# Patient Record
Sex: FEMALE | Race: BLACK OR AFRICAN AMERICAN | Employment: FULL TIME | URBAN - METROPOLITAN AREA
[De-identification: names, ages, dates, MRNs, and addresses within clinical notes are randomized per-mention and may not be internally consistent; named-entity substitution may affect disease eponyms.]

---

## 2017-02-15 ENCOUNTER — OFFICE VISIT (OUTPATIENT)
Dept: SURGERY | Age: 52
End: 2017-02-15

## 2017-02-15 VITALS
BODY MASS INDEX: 44.41 KG/M2 | DIASTOLIC BLOOD PRESSURE: 80 MMHG | HEIGHT: 68 IN | HEART RATE: 78 BPM | SYSTOLIC BLOOD PRESSURE: 142 MMHG | RESPIRATION RATE: 18 BRPM | TEMPERATURE: 97.8 F | OXYGEN SATURATION: 99 % | WEIGHT: 293 LBS

## 2017-02-15 DIAGNOSIS — K43.9 HERNIA OF ANTERIOR ABDOMINAL WALL: ICD-10-CM

## 2017-02-15 DIAGNOSIS — Z98.84 H/O GASTRIC BYPASS: ICD-10-CM

## 2017-02-15 DIAGNOSIS — K90.9 INTESTINAL MALABSORPTION, UNSPECIFIED TYPE: ICD-10-CM

## 2017-02-15 DIAGNOSIS — E66.01 MORBID OBESITY DUE TO EXCESS CALORIES (HCC): Primary | ICD-10-CM

## 2017-02-15 RX ORDER — METFORMIN HYDROCHLORIDE 500 MG/1
TABLET ORAL 2 TIMES DAILY WITH MEALS
COMMUNITY

## 2017-02-15 RX ORDER — MELOXICAM 15 MG/1
TABLET ORAL
COMMUNITY
Start: 2017-02-03

## 2017-02-15 RX ORDER — AMLODIPINE BESYLATE 10 MG/1
TABLET ORAL DAILY
COMMUNITY

## 2017-02-15 NOTE — PROGRESS NOTES
1. Have you been to the ER, urgent care clinic since your last visit? Hospitalized since your last visit? No    2. Have you seen or consulted any other health care providers outside of the [address] [place] [address] since your last visit? Include any pap smears or colon screening.  No

## 2017-02-15 NOTE — PROGRESS NOTES
Initial Consultation for possible revision of previous Bariatric Surgery     Kristine Smith is a 54 y.o. y.o. female who comes into the office today for initial consultation for the surgical options for the treatment of morbid obesity. The patient has chronic obesity unresponsive to numerous treatment strategies. The patient has tried a variety of weight-loss attempts including previous lap RYGB by Dr Dung Rosa in 2004, but has had weight regain over recent years, notably related to increased portions and no dumping- can eat sugar. Her pre-surgery weight was 367 lbs and her lowest postoperative body weight was aproximately 199 lbs. She has regained aproximately all of the lost weight over 7 years. She has been told that her previous surgery is gastric bypass. She does not  receive feedback from the previous surgery. She loves chips and pretzels and grazes on these most of the day. She can eat candy without dumping. The patient has significant health problems due to severe obesity including below. Today the patient is Height: 5' 8\" (172.7 cm) , Weight: (!) 367 lb 8 oz (166.7 kg) for a BMI of Body mass index is 55.88 kg/(m^2). [facility] Weight Loss Metrics 2/15/2017   Today's Wt 367 lb 8 oz   BMI 55.88 kg/m2       Body mass index is 55.88 kg/(m^2). Past Medical History   Diagnosis Date    Anemia     Depression     Diabetes ([address]     Hernia     Hypertension     Joint pain     Joint swelling        Past Surgical History   Procedure Laterality Date    Hx hysterectomy         Current Outpatient Prescriptions   Medication Sig Dispense Refill    meloxicam (MOBIC) 15 mg tablet       amLODIPine (NORVASC) 10 mg tablet Take  by mouth daily.  metFORMIN (GLUCOPHAGE) 500 mg tablet Take  by mouth two (2) times daily (with meals).          No Known Allergies    Social History   Substance Use Topics    Smoking status: Never Smoker    Smokeless tobacco: Never Used    Alcohol use No       Family History Problem Relation Age of Onset    Diabetes Mother     Hypertension Mother     Diabetes Father     Heart Disease Father     Hypertension Father     Cancer Sister     Diabetes Sister     Hypertension Sister     Heart Disease Brother     Hypertension Brother        ROS, positive where in bold:    General: fevers, chills, night sweats, fatigue, weight loss, weight gain. GI: abdominal pain, nausea, vomiting, change in bowel habits, hematochezia, melena, or denies GERD. Integumentary: dermatitis or abnormal moles. HEENT:  visual changes, vertigo, epistaxis, dysphagia, hoarseness    Cardiac: chest pain, palpitations, hypertension, edema,  varicosities    Resp:  cough, shortness of breath, wheezing, hemoptysis, snoring,  reactive airway disease    : hematuria, dysuria, frequency, urgency, nocturia, stress urinary incontinence    MSK: weakness, joint pain,  arthritis    Endocrine: diabetes, thyroid disease, polyuria, polydipsia, polyphagia, poor wound healing, heat intolerance,cold intolerance. Lymph/Heme: anemia, bruising,  history of blood transfusions. Neuro: dizziness, headache, fainting, seizures, stroke.     Psychiatry:  Anxiety, depression, psychosis      Physical Exam:  Visit Vitals    /80 (BP 1 Location: Left arm, BP Patient Position: Sitting)    Pulse 78    Temp 97.8 °F (36.6 °C) (Oral)    Resp 18    Ht 5' 8\" (1.727 m)    Wt (!) 367 lb 8 oz (166.7 kg)    SpO2 99%    BMI 55.88 kg/m2       General: Well developed, well nourished 54 y.o. female in no acute distress  ENMT: normocephalic, atraumatic   Skin: warm, smooth, dry and well perfused  Respiratory:excursions normal and symmetrical  Cardiovascular: Regular rate and rhythm,   Gastrointestinal: soft, nontender, nondistended, unable to feel abdominal wall hernia  Musculoskeletal: normal gait/station  Neuro: strength grossly intact and symmetrical  Psych: alert and oriented to person, place and time      Impression:    Brain Muse Eliseo Bhakta is a 54 y.o. female who is suffering from morbid obesity with a BMI of Body mass index is 55.88 kg/(m^2). and comorbidities who could benefit from weight loss significantly. She is struggling after previous gastric bypass. I believe she may be best managed by anatomic and behavior evaluations. Work up will include an EGD and an UGI series to assess the previous gastric bypass anatomy. In addition will check her routine labs for nutrition follow up. Labs ordered today. Will schedule with RD. We will discuss the results of the above evaluation with the patient when testing is completed. CT scan to assess abdominal wall hernia    . More than 50% of this encounter was spent in direct counseling for this patient regarding the ongoing evaluation and treatment as well as options for future care. All questions have been answered in detail and the patient has expressed satisfaction regarding understanding of all this information. At least 45 minutes were spent in counseling during this encounter.

## 2017-02-17 LAB
1,25(OH)2D3 SERPL-MCNC: 49.2 PG/ML (ref 19.9–79.3)
ALBUMIN SERPL-MCNC: 4.3 G/DL (ref 3.5–5.5)
ALBUMIN/GLOB SERPL: 1.4 {RATIO} (ref 1.1–2.5)
ALP SERPL-CCNC: 145 IU/L (ref 39–117)
ALT SERPL-CCNC: 13 IU/L (ref 0–32)
AST SERPL-CCNC: 14 IU/L (ref 0–40)
BASOPHILS # BLD AUTO: 0.1 X10E3/UL (ref 0–0.2)
BASOPHILS NFR BLD AUTO: 1 %
BILIRUB SERPL-MCNC: 0.5 MG/DL (ref 0–1.2)
BUN SERPL-MCNC: 12 MG/DL (ref 6–24)
BUN/CREAT SERPL: 15 (ref 9–23)
CALCIUM SERPL-MCNC: 9.6 MG/DL (ref 8.7–10.2)
CHLORIDE SERPL-SCNC: 100 MMOL/L (ref 96–106)
CO2 SERPL-SCNC: 27 MMOL/L (ref 18–29)
CREAT SERPL-MCNC: 0.78 MG/DL (ref 0.57–1)
EOSINOPHIL # BLD AUTO: 0.1 X10E3/UL (ref 0–0.4)
EOSINOPHIL NFR BLD AUTO: 1 %
ERYTHROCYTE [DISTWIDTH] IN BLOOD BY AUTOMATED COUNT: 14.3 % (ref 12.3–15.4)
FOLATE SERPL-MCNC: 14.9 NG/ML
GLOBULIN SER CALC-MCNC: 3.1 G/DL (ref 1.5–4.5)
GLUCOSE SERPL-MCNC: 114 MG/DL (ref 65–99)
HCT VFR BLD AUTO: 39.3 % (ref 34–46.6)
HGB BLD-MCNC: 12.9 G/DL (ref 11.1–15.9)
IMM GRANULOCYTES # BLD: 0 X10E3/UL (ref 0–0.1)
IMM GRANULOCYTES NFR BLD: 0 %
IRON SERPL-MCNC: 69 UG/DL (ref 27–159)
LYMPHOCYTES # BLD AUTO: 1.5 X10E3/UL (ref 0.7–3.1)
LYMPHOCYTES NFR BLD AUTO: 25 %
MCH RBC QN AUTO: 27.4 PG (ref 26.6–33)
MCHC RBC AUTO-ENTMCNC: 32.8 G/DL (ref 31.5–35.7)
MCV RBC AUTO: 83 FL (ref 79–97)
MONOCYTES # BLD AUTO: 0.3 X10E3/UL (ref 0.1–0.9)
MONOCYTES NFR BLD AUTO: 6 %
NEUTROPHILS # BLD AUTO: 3.8 X10E3/UL (ref 1.4–7)
NEUTROPHILS NFR BLD AUTO: 67 %
PLATELET # BLD AUTO: 341 X10E3/UL (ref 150–379)
POTASSIUM SERPL-SCNC: 4.9 MMOL/L (ref 3.5–5.2)
PROT SERPL-MCNC: 7.4 G/DL (ref 6–8.5)
RBC # BLD AUTO: 4.71 X10E6/UL (ref 3.77–5.28)
SODIUM SERPL-SCNC: 140 MMOL/L (ref 134–144)
VIT B1 BLD-SCNC: 135.6 NMOL/L (ref 66.5–200)
VIT B12 SERPL-MCNC: 218 PG/ML (ref 211–946)
WBC # BLD AUTO: 5.7 X10E3/UL (ref 3.4–10.8)

## 2017-02-24 ENCOUNTER — HOSPITAL ENCOUNTER (OUTPATIENT)
Dept: GENERAL RADIOLOGY | Age: 52
Discharge: HOME | End: 2017-02-24
Attending: SURGERY
Payer: COMMERCIAL

## 2017-02-24 DIAGNOSIS — Z98.84 H/O GASTRIC BYPASS: ICD-10-CM

## 2017-02-24 PROCEDURE — 74241 XR UPPER GI SERIES W KUB: CPT

## 2017-02-28 ENCOUNTER — ANESTHESIA (OUTPATIENT)
Dept: ENDOSCOPY | Age: 52
End: 2017-02-28
Payer: COMMERCIAL

## 2017-02-28 ENCOUNTER — ANESTHESIA EVENT (OUTPATIENT)
Dept: ENDOSCOPY | Age: 52
End: 2017-02-28
Payer: COMMERCIAL

## 2017-02-28 ENCOUNTER — HOSPITAL ENCOUNTER (OUTPATIENT)
Age: 52
Setting detail: OUTPATIENT SURGERY
Discharge: HOME | End: 2017-02-28
Attending: INTERNAL MEDICINE | Admitting: INTERNAL MEDICINE
Payer: COMMERCIAL

## 2017-02-28 VITALS
BODY MASS INDEX: 44.41 KG/M2 | TEMPERATURE: 97.7 F | RESPIRATION RATE: 22 BRPM | OXYGEN SATURATION: 100 % | SYSTOLIC BLOOD PRESSURE: 156 MMHG | HEART RATE: 72 BPM | HEIGHT: 68 IN | WEIGHT: 293 LBS | DIASTOLIC BLOOD PRESSURE: 73 MMHG

## 2017-02-28 LAB
GLUCOSE BLD STRIP.AUTO-MCNC: 109 MG/DL (ref 65–100)
SERVICE CMNT-IMP: ABNORMAL

## 2017-02-28 PROCEDURE — 76040000019: Performed by: INTERNAL MEDICINE

## 2017-02-28 PROCEDURE — 74011000250 HC RX REV CODE- 250

## 2017-02-28 PROCEDURE — 82962 GLUCOSE BLOOD TEST: CPT

## 2017-02-28 PROCEDURE — 74011250636 HC RX REV CODE- 250/636

## 2017-02-28 PROCEDURE — 76060000031 HC ANESTHESIA FIRST 0.5 HR: Performed by: INTERNAL MEDICINE

## 2017-02-28 PROCEDURE — 74011250636 HC RX REV CODE- 250/636: Performed by: INTERNAL MEDICINE

## 2017-02-28 PROCEDURE — 74011000258 HC RX REV CODE- 258

## 2017-02-28 RX ORDER — DEXTROMETHORPHAN/PSEUDOEPHED 2.5-7.5/.8
1.2 DROPS ORAL
Status: DISCONTINUED | OUTPATIENT
Start: 2017-02-28 | End: 2017-02-28 | Stop reason: HOSPADM

## 2017-02-28 RX ORDER — ATROPINE SULFATE 0.1 MG/ML
0.5 INJECTION INTRAVENOUS
Status: DISCONTINUED | OUTPATIENT
Start: 2017-02-28 | End: 2017-02-28 | Stop reason: HOSPADM

## 2017-02-28 RX ORDER — SODIUM CHLORIDE 0.9 % (FLUSH) 0.9 %
5-10 SYRINGE (ML) INJECTION EVERY 8 HOURS
Status: DISCONTINUED | OUTPATIENT
Start: 2017-02-28 | End: 2017-02-28 | Stop reason: HOSPADM

## 2017-02-28 RX ORDER — PROPOFOL 10 MG/ML
INJECTION, EMULSION INTRAVENOUS AS NEEDED
Status: DISCONTINUED | OUTPATIENT
Start: 2017-02-28 | End: 2017-02-28 | Stop reason: HOSPADM

## 2017-02-28 RX ORDER — MIDAZOLAM HYDROCHLORIDE 1 MG/ML
.25-1 INJECTION, SOLUTION INTRAMUSCULAR; INTRAVENOUS
Status: DISCONTINUED | OUTPATIENT
Start: 2017-02-28 | End: 2017-02-28 | Stop reason: HOSPADM

## 2017-02-28 RX ORDER — FENTANYL CITRATE 50 UG/ML
100 INJECTION, SOLUTION INTRAMUSCULAR; INTRAVENOUS
Status: DISCONTINUED | OUTPATIENT
Start: 2017-02-28 | End: 2017-02-28 | Stop reason: HOSPADM

## 2017-02-28 RX ORDER — LIDOCAINE HYDROCHLORIDE 20 MG/ML
INJECTION, SOLUTION EPIDURAL; INFILTRATION; INTRACAUDAL; PERINEURAL AS NEEDED
Status: DISCONTINUED | OUTPATIENT
Start: 2017-02-28 | End: 2017-02-28 | Stop reason: HOSPADM

## 2017-02-28 RX ORDER — NALOXONE HYDROCHLORIDE 0.4 MG/ML
0.4 INJECTION, SOLUTION INTRAMUSCULAR; INTRAVENOUS; SUBCUTANEOUS
Status: DISCONTINUED | OUTPATIENT
Start: 2017-02-28 | End: 2017-02-28 | Stop reason: HOSPADM

## 2017-02-28 RX ORDER — SODIUM CHLORIDE 9 MG/ML
INJECTION, SOLUTION INTRAVENOUS
Status: DISCONTINUED | OUTPATIENT
Start: 2017-02-28 | End: 2017-02-28 | Stop reason: HOSPADM

## 2017-02-28 RX ORDER — FLUMAZENIL 0.1 MG/ML
0.2 INJECTION INTRAVENOUS
Status: DISCONTINUED | OUTPATIENT
Start: 2017-02-28 | End: 2017-02-28 | Stop reason: HOSPADM

## 2017-02-28 RX ORDER — SODIUM CHLORIDE 0.9 % (FLUSH) 0.9 %
5-10 SYRINGE (ML) INJECTION AS NEEDED
Status: DISCONTINUED | OUTPATIENT
Start: 2017-02-28 | End: 2017-02-28 | Stop reason: HOSPADM

## 2017-02-28 RX ORDER — SODIUM CHLORIDE 9 MG/ML
100 INJECTION, SOLUTION INTRAVENOUS CONTINUOUS
Status: DISCONTINUED | OUTPATIENT
Start: 2017-02-28 | End: 2017-02-28 | Stop reason: HOSPADM

## 2017-02-28 RX ORDER — EPINEPHRINE 0.1 MG/ML
1 INJECTION INTRACARDIAC; INTRAVENOUS
Status: DISCONTINUED | OUTPATIENT
Start: 2017-02-28 | End: 2017-02-28 | Stop reason: HOSPADM

## 2017-02-28 RX ORDER — GLYCOPYRROLATE 0.2 MG/ML
INJECTION INTRAMUSCULAR; INTRAVENOUS AS NEEDED
Status: DISCONTINUED | OUTPATIENT
Start: 2017-02-28 | End: 2017-02-28 | Stop reason: HOSPADM

## 2017-02-28 RX ORDER — DIPHENHYDRAMINE HYDROCHLORIDE 50 MG/ML
50 INJECTION, SOLUTION INTRAMUSCULAR; INTRAVENOUS ONCE
Status: DISCONTINUED | OUTPATIENT
Start: 2017-02-28 | End: 2017-02-28 | Stop reason: HOSPADM

## 2017-02-28 RX ADMIN — SODIUM CHLORIDE: 9 INJECTION, SOLUTION INTRAVENOUS at 15:23

## 2017-02-28 RX ADMIN — PROPOFOL 50 MG: 10 INJECTION, EMULSION INTRAVENOUS at 15:44

## 2017-02-28 RX ADMIN — PROPOFOL 100 MG: 10 INJECTION, EMULSION INTRAVENOUS at 15:46

## 2017-02-28 RX ADMIN — PROPOFOL 100 MG: 10 INJECTION, EMULSION INTRAVENOUS at 15:43

## 2017-02-28 RX ADMIN — PROPOFOL 50 MG: 10 INJECTION, EMULSION INTRAVENOUS at 15:48

## 2017-02-28 RX ADMIN — GLYCOPYRROLATE 0.2 MG: 0.2 INJECTION INTRAMUSCULAR; INTRAVENOUS at 15:42

## 2017-02-28 RX ADMIN — SODIUM CHLORIDE 100 ML/HR: 900 INJECTION, SOLUTION INTRAVENOUS at 15:07

## 2017-02-28 RX ADMIN — LIDOCAINE HYDROCHLORIDE 60 MG: 20 INJECTION, SOLUTION EPIDURAL; INFILTRATION; INTRACAUDAL; PERINEURAL at 15:51

## 2017-02-28 RX ADMIN — PROPOFOL 50 MG: 10 INJECTION, EMULSION INTRAVENOUS at 15:52

## 2017-02-28 RX ADMIN — LIDOCAINE HYDROCHLORIDE 60 MG: 20 INJECTION, SOLUTION EPIDURAL; INFILTRATION; INTRACAUDAL; PERINEURAL at 15:43

## 2017-02-28 RX ADMIN — PROPOFOL 50 MG: 10 INJECTION, EMULSION INTRAVENOUS at 15:50

## 2017-02-28 RX ADMIN — PROPOFOL 50 MG: 10 INJECTION, EMULSION INTRAVENOUS at 15:45

## 2017-02-28 RX ADMIN — PROPOFOL 50 MG: 10 INJECTION, EMULSION INTRAVENOUS at 15:55

## 2017-02-28 NOTE — DISCHARGE INSTRUCTIONS
[address] [facility] [address], [address] [employer]    EGD DISCHARGE INSTRUCTIONS    Negrito Crump  [id number]  1965    Discomfort:  Sore throat- throat lozenges or warm salt water gargle  redness at IV site- apply warm compress to area; if redness or soreness persist- contact your physician  Gaseous discomfort- walking, belching will help relieve any discomfort  You may not operate a vehicle for 12 hours  You may not engage in an occupation involving machinery or appliances for rest of today  You may not drink alcoholic beverages for at least 12 hours  Avoid making any critical decisions for at least 24 hour  DIET  You may resume your regular diet - however -  remember your colon is empty and a heavy meal will produce gas. Avoid these foods:  vegetables, fried / greasy foods, carbonated drinks    ACTIVITY  You may resume your normal daily activities   Spend the remainder of the day resting -  avoid any strenuous activity. CALL M.D. ANY SIGN OF   Increasing pain, nausea, vomiting  Abdominal distension (swelling)  New increased bleeding (oral or rectal)  Fever (chills)  Pain in chest area  Bloody discharge from nose or mouth  Shortness of breath    Follow-up Instructions:   Call Dr. Sedalia Barthel for any questions or problems. Telephone # [phone]    ENDOSCOPY FINDINGS:   Your endoscopy showed normal appearing gastric bypass anatomy. Dr. Samuel Hopes will discuss the specific findings with you and the next step in the plan. Signed By: Aniyah Molina.  Sarah Young MD     2/28/2017  4:07 PM

## 2017-02-28 NOTE — PERIOP NOTES
,Anesthesia reports  meds given during procedure, see MAR. Received report from anesthesia staff on vital signs and status of patient.

## 2017-02-28 NOTE — ROUTINE PROCESS
Yarelis Clark  1965  [id number]    Situation:  Verbal report received from: Missy OLGUIN  Procedure: Procedure(s):  ESOPHAGOGASTRODUODENOSCOPY (EGD)    Background:    Preoperative diagnosis: STATUS POST GASTRIC BYPASS  Postoperative diagnosis: History of gastric bypass. Weight regained. :  Dr. Hilda Schneider  Assistant(s): Endoscopy Technician-1: Yohan Wynn  Endoscopy RN-1: Madiha Gramajo RN    Specimens: * No specimens in log *  H. Pylori  no    Assessment:  Intra-procedure medications   Anesthesia gave intra-procedure sedation and medications, see anesthesia flow sheet yes    Intravenous fluids: NS@ KVO     Vital signs stable     Abdominal assessment: round and soft     Recommendation:  Discharge patient per MD order.     Family or Friend   Permission to share finding with family or friend yes

## 2017-02-28 NOTE — H&P
[address]  [address], [address] [facility]      History and Physical       NAME:  Azul Holder   :   1965   MRN:   [id number]             History of Present Illness:  Patient is a 54 y.o. who is seen for weight regain following gastric bypass. She is being considered for gastric bypass revision by Dr. Raymond Hiarston. PMH:  Past Medical History:   Diagnosis Date    Anemia     Depression     Diabetes ([address]     Hernia     Hypertension     Joint pain     Joint swelling        PSH:  Past Surgical History:   Procedure Laterality Date    HX GASTRIC BYPASS      \"15 years ago\"    HX HYSTERECTOMY         Allergies:  No Known Allergies    Home Medications:  Prior to Admission Medications   Prescriptions Last Dose Informant Patient Reported? Taking? amLODIPine (NORVASC) 10 mg tablet 2017 at Unknown time  Yes Yes   Sig: Take  by mouth daily. meloxicam (MOBIC) 15 mg tablet 2017 at Unknown time  Yes Yes   metFORMIN (GLUCOPHAGE) 500 mg tablet 2017 at Unknown time  Yes Yes   Sig: Take  by mouth two (2) times daily (with meals).       Facility-Administered Medications: None       Hospital Medications:  Current Facility-Administered Medications   Medication Dose Route Frequency    0.9% sodium chloride infusion  100 mL/hr IntraVENous CONTINUOUS    sodium chloride (NS) flush 5-10 mL  5-10 mL IntraVENous Q8H    sodium chloride (NS) flush 5-10 mL  5-10 mL IntraVENous PRN    midazolam (VERSED) injection 0.25-10 mg  0.25-10 mg IntraVENous Multiple    fentaNYL citrate (PF) injection 100 mcg  100 mcg IntraVENous Multiple    naloxone (NARCAN) injection 0.4 mg  0.4 mg IntraVENous Multiple    flumazenil (ROMAZICON) 0.1 mg/mL injection 0.2 mg  0.2 mg IntraVENous Multiple    simethicone (MYLICON) 41PN/9.5QW oral drops 80 mg  1.2 mL Oral Multiple    diphenhydrAMINE (BENADRYL) injection 50 mg  50 mg IntraVENous ONCE    atropine injection 0.5 mg  0.5 mg IntraVENous ONCE PRN    EPINEPHrine (ADRENALIN) 0.1 mg/mL syringe 1 mg  1 mg Endoscopically ONCE PRN     Facility-Administered Medications Ordered in Other Encounters   Medication Dose Route Frequency    0.9% sodium chloride infusion   IntraVENous CONTINUOUS       Social History:  Social History   Substance Use Topics    Smoking status: Never Smoker    Smokeless tobacco: Never Used    Alcohol use No       Family History:  Family History   Problem Relation Age of Onset    Diabetes Mother     Hypertension Mother     Diabetes Father     Heart Disease Father     Hypertension Father     Cancer Sister     Diabetes Sister     Hypertension Sister     Heart Disease Brother     Hypertension Brother              Review of Systems:      Constitutional: negative fever, negative chills, negative weight loss  Eyes:   negative visual changes  ENT:   negative sore throat, tongue or lip swelling  Respiratory:  negative cough, negative dyspnea  Cards:  negative for chest pain, palpitations, lower extremity edema  GI:   See HPI  :  negative for frequency, dysuria  Integument:  negative for rash and pruritus  Heme:  negative for easy bruising and gum/nose bleeding  Musculoskel: negative for myalgias,  back pain and muscle weakness  Neuro: negative for headaches, dizziness, vertigo  Psych:  negative for feelings of anxiety, depression       Objective:   Patient Vitals for the past 8 hrs:   BP Temp Pulse Resp SpO2 Height Weight   02/28/17 1441 134/88 98.1 °F (36.7 °C) 76 18 99 % 5' 8\" (1.727 m) (!) 167.4 kg (369 lb 1 oz)             EXAM:     NEURO-a&o   HEENT-wnl   LUNGS-clear    COR-regular rate and rhythym     ABD-soft , no tenderness, no rebound, good bs     EXT-no edema     Data Review     No results for input(s): WBC, HGB, HCT, PLT, HGBEXT, HCTEXT, PLTEXT in the last 72 hours. No results for input(s): NA, K, CL, CO2, BUN, CREA, GLU, PHOS, CA in the last 72 hours.   No results for input(s): SGOT, GPT, AP, TBIL, TP, ALB, GLOB, GGT, AML, LPSE in the last 72 hours. No lab exists for component: AMYP, HLPSE  No results for input(s): INR, PTP, APTT in the last 72 hours. No lab exists for component: INREXT       Assessment:   · Weight regain after gastric bypass   There is no problem list on file for this patient. Plan:   · Endoscopic procedure with MAC     Signed By: Galen Pereira.  Alyssa Low MD     2/28/2017  3:40 PM

## 2017-02-28 NOTE — ANESTHESIA POSTPROCEDURE EVALUATION
Post-Anesthesia Evaluation and Assessment    Patient: Yarelis Clark MRN: [id number]  SSN: [id number]    YOB: 1965  Age: 54 y.o. Sex: female       Cardiovascular Function/Vital Signs  Visit Vitals    BP (!) 154/104    Pulse 91    Temp 36.5 °C (97.7 °F)    Resp 19    Ht 5' 8\" (1.727 m)    Wt (!) 167.4 kg (369 lb 1 oz)    SpO2 97%    BMI 56.12 kg/m2       Patient is status post MAC anesthesia for Procedure(s):  ESOPHAGOGASTRODUODENOSCOPY (EGD). Nausea/Vomiting: None    Postoperative hydration reviewed and adequate. Pain:  Pain Scale 1: Numeric (0 - 10) (02/28/17 1611)  Pain Intensity 1: 0 (02/28/17 1611)   Managed    Neurological Status: At baseline    Mental Status and Level of Consciousness: Arousable    Pulmonary Status:   O2 Device: Room air (02/28/17 1611)   Adequate oxygenation and airway patent    Complications related to anesthesia: None    Post-anesthesia assessment completed.  No concerns    Signed By: Tim De Leon MD     February 28, 2017

## 2017-02-28 NOTE — PROCEDURES
[facility] ([facility] Procedure Note    Shabnam Hedrick  1965  [id number]      Procedure: Endoscopic Gastroduodenoscopy --diagnostic    Indication: weight regain after gastric bypass    Pre-operative Diagnosis: see indication above    Post-operative Diagnosis: see findings below    : Niesha Rodríguez. Yoan Wise MD    Referring Provider:  Becca Infante MD    Anesthesia/Sedation:  MAC anesthesia Propofol        Procedure Details     After informed consent was obtained for the procedure, with all risks and benefits of procedure explained the patient was taken to the endoscopy suite and placed in the left lateral decubitus position. Following sequential administration of sedation as per above, the endoscope was inserted into the mouth and advanced under direct vision to the Venkata limb. A careful inspection was made as the gastroscope was withdrawn, including a retroflexed view of the gastric pouch; findings and interventions are described below. Findings: The esophagus was normal appearing. The Z line was normal appearing and was located at 37 cm. The gastric pouch was spherical in shape. There were no obvious G-G fistulae. The G-J anastomosis was located at 46 cm. There was no evidence of marginal ulceration. Retroflexion was performed on the jejunal side and the diameter of the G-J anastomosis was estimated at approximately 18-20 mm (GIF H180 scope is 9.8 mm in diameter). The gastric pouch was 9 cm in length. The blind end of the jejunal side of the anastomosis was located at 52 cm. The Venkata limb was visualized to the length of the upper endoscope. At the distal most point of the exam, there was minimal bile reflux visualized. EBL: None      Complications:   None; patient tolerated the procedure well. Impression:    1.  Spherically shaped gastric pouch (9 cm in length, approximately 4200 cubic mm in volume) with no gastro-gastric fisulate  2. Patent GJ anastomosis approximately 20 mm in diameter with no evidence of marginal ulceration  3. Relatively long blind end of jejunal side of G-J anastomosis  4. Minimal bile reflux seen in distal Venkata limb    Recommendations:  1. Follow up with Dr. Sidney Anaya By: Juancarlos Block.  Krupa Blake MD     2/28/2017  4:00 PM

## 2017-02-28 NOTE — ANESTHESIA PREPROCEDURE EVALUATION
Anesthetic History   No history of anesthetic complications            Review of Systems / Medical History  Patient summary reviewed, nursing notes reviewed and pertinent labs reviewed    Pulmonary  Within defined limits                 Neuro/Psych         Psychiatric history     Cardiovascular    Hypertension: well controlled              Exercise tolerance: >4 METS     GI/Hepatic/Renal  Within defined limits              Endo/Other    Diabetes: type 2    Morbid obesity     Other Findings              Physical Exam    Airway  Mallampati: II  TM Distance: > 6 cm  Neck ROM: normal range of motion   Mouth opening: Normal     Cardiovascular  Regular rate and rhythm,  S1 and S2 normal,  no murmur, click, rub, or gallop             Dental  No notable dental hx       Pulmonary  Breath sounds clear to auscultation               Abdominal  GI exam deferred       Other Findings            Anesthetic Plan    ASA: 3  Anesthesia type: MAC          Induction: Intravenous  Anesthetic plan and risks discussed with: Patient

## 2017-03-03 ENCOUNTER — HOSPITAL ENCOUNTER (OUTPATIENT)
Dept: CT IMAGING | Age: 52
Discharge: HOME | End: 2017-03-03
Attending: SURGERY
Payer: COMMERCIAL

## 2017-03-03 DIAGNOSIS — K43.9 HERNIA OF ANTERIOR ABDOMINAL WALL: ICD-10-CM

## 2017-03-03 PROCEDURE — 74011636320 HC RX REV CODE- 636/320: Performed by: SURGERY

## 2017-03-03 PROCEDURE — 74177 CT ABD & PELVIS W/CONTRAST: CPT

## 2017-03-03 PROCEDURE — 74011000258 HC RX REV CODE- 258: Performed by: SURGERY

## 2017-03-03 RX ORDER — SODIUM CHLORIDE 0.9 % (FLUSH) 0.9 %
10 SYRINGE (ML) INJECTION
Status: COMPLETED | OUTPATIENT
Start: 2017-03-03 | End: 2017-03-03

## 2017-03-03 RX ADMIN — IOPAMIDOL 100 ML: 755 INJECTION, SOLUTION INTRAVENOUS at 12:30

## 2017-03-03 RX ADMIN — IOHEXOL 50 ML: 240 INJECTION, SOLUTION INTRATHECAL; INTRAVASCULAR; INTRAVENOUS; ORAL at 12:24

## 2017-03-03 RX ADMIN — Medication 10 ML: at 12:30

## 2017-03-03 RX ADMIN — SODIUM CHLORIDE 100 ML: 900 INJECTION, SOLUTION INTRAVENOUS at 12:30

## 2017-03-09 ENCOUNTER — TELEPHONE (OUTPATIENT)
Dept: SURGERY | Age: 52
End: 2017-03-09

## 2017-03-09 NOTE — TELEPHONE ENCOUNTER
----- Message from Bernard Rendon MD sent at 3/9/2017  1:46 PM EST -----  Regarding: return visit please schedule  Hi - this patient needs a follow up with me regarding her hernias (she has 2 of them) and the evaluation of her gastric bypass situation.   I have her test results- please schedule her to come back  Thanks  Tomie Denver

## 2017-03-15 ENCOUNTER — OFFICE VISIT (OUTPATIENT)
Dept: SURGERY | Age: 52
End: 2017-03-15

## 2017-03-15 VITALS
RESPIRATION RATE: 16 BRPM | HEIGHT: 68 IN | WEIGHT: 293 LBS | OXYGEN SATURATION: 98 % | BODY MASS INDEX: 44.41 KG/M2 | TEMPERATURE: 97.8 F | HEART RATE: 86 BPM | SYSTOLIC BLOOD PRESSURE: 140 MMHG | DIASTOLIC BLOOD PRESSURE: 100 MMHG

## 2017-03-15 DIAGNOSIS — E66.01 MORBID OBESITY DUE TO EXCESS CALORIES (HCC): Primary | ICD-10-CM

## 2017-03-15 RX ORDER — PHENOL/SODIUM PHENOLATE
20 AEROSOL, SPRAY (ML) MUCOUS MEMBRANE 2 TIMES DAILY
Qty: 60 TAB | Refills: 3 | Status: SHIPPED | OUTPATIENT
Start: 2017-03-15

## 2017-03-15 NOTE — PROGRESS NOTES
1. Have you been to the ER, urgent care clinic since your last visit? Hospitalized since your last visit? No    2. Have you seen or consulted any other health care providers outside of the [address] [place] [address] since your last visit? Include any pap smears or colon screening. No       Per Ashlie Ontiveros was asked to give patient Robbie Lott information, offered the card to patient and she stated she didn't need that and threw it down. I advised patient again that she would need to have 3-6 visits with dietician with progress and she left the exam room.

## 2017-03-15 NOTE — PROGRESS NOTES
Returns to discuss work up and next steps regarding weight regain of 100% of lost weight after gastric bypass  She has not contacted RD although she admits she was given the contact info last time  Active Ambulatory Problems     Diagnosis Date Noted    No Active Ambulatory Problems     Resolved Ambulatory Problems     Diagnosis Date Noted    No Resolved Ambulatory Problems     Past Medical History:   Diagnosis Date    Anemia     Depression     Diabetes ([address]     Hernia     History of esophagogastroduodenoscopy (EGD) 02/28/2017    Hypertension     Joint pain     Joint swelling      Current Outpatient Prescriptions on File Prior to Visit   Medication Sig Dispense Refill    meloxicam (MOBIC) 15 mg tablet       amLODIPine (NORVASC) 10 mg tablet Take  by mouth daily.  metFORMIN (GLUCOPHAGE) 500 mg tablet Take  by mouth two (2) times daily (with meals). No current facility-administered medications on file prior to visit.       Visit Vitals    BP (!) 140/100 (BP 1 Location: Left arm, BP Patient Position: Sitting)    Pulse 86    Temp 97.8 °F (36.6 °C) (Oral)    Resp 16    Ht 5' 8\" (1.727 m)    Wt (!) 371 lb 8 oz (168.5 kg)    SpO2 98%    BMI 56.49 kg/m2     WT / BMI 3/15/2017 2/28/2017 2/15/2017   WEIGHT 371 lb 8 oz 369 lb 1 oz 367 lb 8 oz   BODY MASS INDEX 56.49 kg/m2 56.12 kg/m2 55.88 kg/m2   ENMT: normocephalic, atraumatic   Respiratory: excursions normal and symmetrical  Cardiovascular: Regular rate and rhythm  Abdomen:  Hernias at umibilicus and lower abdomen- see CT  Musculoskeletal: normal gait/station  Neuro: symmetrical  Psych: alert and oriented to person, place and time      UGI suspect HH, large pouch volume  Tablet delayed at GE junction        CT shows umbilical hernia with loop of small bowel, lower hernia with fat  EGD confirms enlarged pouch volume    Impression  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 In fact she claims she is not required to do this based on her [employer] policy which I doubt. However although we can ascertain if health insurance requires any period of non surgical diet counseling before revision, I have also informed her that this is part of the protocol I follow in order to determine if a patient is an appropriate candidate for revisional surgery or not. I have given her the detailed example of sweets eating, which she has problems with, and how this behavior can not be corrected via revisional surgery. After I left the room I could hear the patient expressing dissatisfaction with my recommendations to her  and complaining that she wanted to have her revision done sooner not later. When the nurse provided the patient with the RD's business card, the patient dropped it on the floor and stated that she did not need this information. The nurse had the impression that the patient did not plan to follow through with the programmatic approach to getting back on track and was quite annoyed that we were going to require this before consideration of revision options. At this time we will see if the patient decides to begin working with the RD or not. More than 50% of this encounter was spent in direct counseling for this patient regarding the ongoing evaluation and treatment as well as options for future care. All questions have been answered in detail and the patient has expressed satisfaction regarding understanding of all this information. At least 30 minutes were spent in counseling during this encounter.

## 2017-04-05 ENCOUNTER — CLINICAL SUPPORT (OUTPATIENT)
Dept: SURGERY | Age: 52
End: 2017-04-05

## 2017-04-05 VITALS — BODY MASS INDEX: 44.41 KG/M2 | HEIGHT: 68 IN | WEIGHT: 293 LBS

## 2017-04-05 DIAGNOSIS — E66.01 MORBID OBESITY WITH BMI OF 50.0-59.9, ADULT (HCC): Primary | ICD-10-CM

## 2017-04-05 NOTE — PROGRESS NOTES
Pre-operative Bariatric Nutrition Evaluation    Date: 2017   Maria Eugenia Simpson M.D. Name: Merissa Haywood  :  1965  Age:  54  Gender: Female   Type of Surgery: [x]           Gastric Bypass  []           LAGB  []           Sleeve Gastrectomy    ASSESSMENT:    Past Medical History:HTN, Type II DM, arthritis      Medications/Supplements:   Prior to Admission medications    Medication Sig Start Date End Date Taking? Authorizing Provider   Omeprazole delayed release (PRILOSEC D/R) 20 mg tablet Take 1 Tab by mouth two (2) times a day. Indications: GASTROESOPHAGEAL REFLUX, HEARTBURN 3/15/17   Rosita Harris MD   meloxicmaria esther FLOWERS [facility]) 15 mg tablet  2/3/17   Historical Provider   amLODIPine (NORVASC) 10 mg tablet Take  by mouth daily. Historical Provider   metFORMIN (GLUCOPHAGE) 500 mg tablet Take  by mouth two (2) times daily (with meals). Historical Provider       Food Allergies/Intolerances:none     Anthropometrics:    Ht:68\"    Wt: 371#    IBW: 140#    %IBW: 265%     BMI:56    Category: obesity III    Reported wt history:Pt presents today for pre-operative nutrition evaluation seeking possible revision to gastric bypass. Pt states she has struggled with her wt her entire life and has always been \"tall and heavy\". Lowest adult BW was 177# in high school. 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 Had gastric bypass in  with Dr. Carina Cruz. Reports pre-operative wt of 367# and lowest post-operative wt was 199# indicating a 168# wt loss. Began regaining wt in  when she started another business and gained 100#. Pt blamed stress as primary reason for wt gain, taking very little personal responsibility with eating habits or lifestyle factors as contributors to wt regain. Pt expressed frustration during the appointment for having to see an RD and having to complete possible supervised wt loss for insurance.  She says \"no one has told me what I have to\" and \"I need this surgery now\" \"I don't have time to wait\". Pt reports that she has started making changes since initial consultation with Dr. Tequila Heath including walking for 10-20 minutes, 2 times per week and eliminated potato chips. Pt identifies grazing as her biggest challenge and states \"the surgery will help me change that\". Pt is seeking possible revision to gastric bypass. Exercise/Physical Activity:walking the dog for 10-20 minutes, 2 times per week for the past week. Reported Diet History:gastric bypass; was prescribed Phentermine in the past but never took it    24 Hour Diet Recall  Breakfast  Skips; drinks coffee   Lunch  [place] or leftovers   Dinner  Fast food most nights or rotisserie chicken, mashed potatoes or mac n cheese; rarely cooks at home; pt couldn't remember the last time she cooked at home   Snacks  Chips, crackers, popcorn, caramel rice cakes    Beverages  Water, diet soda, coffee     A pre-op nutrition checklist was reviewed. Patient checked off 3 of 15 items. Environment/Psychosocial/Support: pt's partner present during appointment but did not participate in discussion     NUTRITION DIAGNOSIS:  1. Excessive energy intake r/t food and beverage preferences evidenced by diet recall that reveals mostly high caloric density foods and beverages. Relies heavily on fast food and prepared meals. Rarely grocery shops to prepare meals at home. 2. Self-monitoring deficit r/t lack of value for this change evidenced by pt skips meals and reports grazing as a big challenge. NUTRITION INTERVENTION:  Pt educated on nutrition recommendations for weight loss surgery, specifically gastric bypass. Instructed on consuming 3 meals per day starting now. Use the balanced plate method to plan meals, include 3 oz of lean source of protein, 1/2 cup whole grains, unlimited non-starchy vegetables, 1/2 cup fruit and 1 serving of low fat dairy.  Utilize handouts listing healthy snack and meal ideas to limit restaurant meals. After surgery measure all meals to 1/2 cup. Each meal will contain a 1/4 cup lean protein and 1/4 cup fruit, non-starchy vegetable or starch (limiting to once per day). Aim for 60 g protein per day. Sip on 48-64 oz of sugar free, calorie free, non-carbonated beverages each day. Do not use a straw. Do not consume beverages 30 minutes before, during or 30 minutes after meals. Read all nutrition labels. Demonstrated and emphasized identifying serving size, total fat, sugar and protein content. Defined low fat as </= 3 g per serving. Discussed lean and extra lean sources of protein. Provided list of low fat cooking methods. Avoid foods with sugar listed in the first 3 ingredients and >/15 g sugar per serving. Excess sugar/fat intake may lead to dumping syndrome. Discussed signs and symptoms of dumping syndrome. Practice mindful eating habits; take small bites, chew thoroughly, avoid distractions, utilize hunger/fullness scale. Consume meals over 20-30 minutes. Attend Bariatric Support Group and increase physical activity (approved per MD) for long term weight maintenance. NUTRITION MONITORING AND EVALUATION:    The following goals were established with patient;  1. Eat 3 meals a day. Can use a protein shake for breakfast for convenience. List of shakes provided. 2. Less fast food. Identify most realistic nights of the week for cooking at home. 3. Less grazing. Include healthy portion controlled, planned snacks PRN. 4. Reduce soda, drink more water. Aim for 64 oz fluids daily. 5. Maintain walking and increase. Specific tips and techniques to facilitate compliance with above recommendations were provided and discussed. Pt was strongly encourage to begin making necessary changes now, attend support group, and re-visit the dietitian prn.  Based on current lifestyle patient is not appropriate candidate for weight loss surgery. She has many important changes to make in order to be considered an appropriate candidate. Unsure of patient's readiness for change at this time. Goals have been discussed and set and follow up set for 3 weeks. Will continue to assess at that time. If further details are desired please feel free to contact me at [phone]. This phone number was also provided to the patient for any further questions or concerns.            Rosa Maria Brasher RD

## 2017-04-26 ENCOUNTER — CLINICAL SUPPORT (OUTPATIENT)
Dept: SURGERY | Age: 52
End: 2017-04-26

## 2017-04-26 VITALS — BODY MASS INDEX: 56.41 KG/M2 | WEIGHT: 293 LBS

## 2017-04-26 DIAGNOSIS — E66.01 MORBID OBESITY WITH BMI OF 50.0-59.9, ADULT (HCC): Primary | ICD-10-CM

## 2017-04-26 NOTE — PROGRESS NOTES
Pre-operative Bariatric Nutrition Evaluation - Follow Up     Date: 2017   Physician/Surgeon:Agustín Liu M.D. Name: Rebekah Lazaro  :  1965  Age:  50  Gender: Female   Type of Surgery: [x]           Gastric Bypass  []           LAGB  []           Sleeve Gastrectomy    ASSESSMENT:  Pt presents today for follow up nutrition evaluation seeking possible revision surgery with Dr. Ewa Bustillos. At initial nutrition evaluation pt demonstrated low level of motivation for making lifestyle changes. We discussed small realistic changes for her to work on and set goals. Since initial visit pt reports drinking more water, eating breakfast more consistently, doing more walking and taking smaller bites when she eats. Pt is still skipping meals, relying heavily on fast food meals, lacks consistent/routine walking/exercise and is still struggling with some snacking/grazing/emotional eating behaviors. She has made some progress as evidenced by reported changes and did not gain weight. She still has a lot of progress to make with her overall lifestyle. However, today she presents with what seems to be a more open attitude towards making lifestyle changes. Appears a busy schedule is her biggest perceived challenge with making changes at this time. Anthropometrics:    Ht:68\"  Today's Wt: 371#  Wt at previous visit (17): 371#  Net change: 0     IBW: 140#    %IBW: 265%     BMI:56    Category: obesity III     Exercise/Physical Activity:small amounts of walking, time and weather prevent her from being more consistent     24 Hour Diet Recall  Breakfast  2 pcs whole wheat toast w/ butter; coffee    Lunch  Fast food    Dinner  Fast food or protein/starches    Snacks  Trying to limit   Beverages  Water w/ lemon, occasional sodas, coffee        NUTRITION DIAGNOSIS:  1. Physical inactivity r/t perception that time constraints and weather prevent more consistent exercise evidenced by pt reports inconsistent walking/exercise. 2. Excessive energy intake r/t reliance on fast food meals and snacking/grazing patterns evidenced by pt reports consuming fast food meals frequently d/t busy schedule and travel for work. NUTRITION INTERVENTION:  Pt educated on nutrition recommendations for weight loss surgery, specifically gastric bypass. Instructed on consuming 3 meals per day starting now. Use the balanced plate method to plan meals, include 3 oz of lean source of protein, 1/2 cup whole grains, unlimited non-starchy vegetables, 1/2 cup fruit and 1 serving of low fat dairy. Utilize handouts listing healthy snack and meal ideas to limit restaurant meals. After surgery measure all meals to 1/2 cup. Each meal will contain a 1/4 cup lean protein and 1/4 cup fruit, non-starchy vegetable or starch (limiting to once per day). Aim for 60 g protein per day. Sip on 48-64 oz of sugar free, calorie free, non-carbonated beverages each day. Do not use a straw. Do not consume beverages 30 minutes before, during or 30 minutes after meals. Read all nutrition labels. Demonstrated and emphasized identifying serving size, total fat, sugar and protein content. Defined low fat as </= 3 g per serving. Discussed lean and extra lean sources of protein. Provided list of low fat cooking methods. Avoid foods with sugar listed in the first 3 ingredients and >/15 g sugar per serving. Excess sugar/fat intake may lead to dumping syndrome. Discussed signs and symptoms of dumping syndrome. Practice mindful eating habits; take small bites, chew thoroughly, avoid distractions, utilize hunger/fullness scale. Consume meals over 20-30 minutes. Attend Bariatric Support Group and increase physical activity (approved per MD) for long term weight maintenance. NUTRITION MONITORING AND EVALUATION:    The following goals were established with patient;  1. Increase walking to be more consistent - 3 times per week.  Research local [place], indoor malls, school tracks, walking routes to allow for walking despite changes in weather and schedule. 2. Limit reliance on fast food meals. Pack meals when away from home. Use protein shakes. If ordering fast food, choose grilled protein sources and order side salad/veggies/fruit cup in place of french fries. 3. Continue to avoid snacking/grazing for emotional reasons. Use gum/mint for cravings. Allow a healthy snack if physically hungry. 4. Continue to drink more water and less soda, working towards elimination of soda. 5. Follow up with RD in one month 5/24/17. Specific tips and techniques to facilitate compliance with above recommendations were provided and discussed. Pt was strongly encourage to begin making necessary changes now, attend support group, and re-visit the dietitian prn. Patient has demonstrated small changes over the past few weeks however, will need to demonstrate continued commitment to these changes over time. Important changes still to make. Will follow up next month for continued evaluation. If further details are desired please feel free to contact me at [phone]. This phone number was also provided to the patient for any further questions or concerns.            Gelacio Williamson RD

## (undated) DEVICE — KENDALL RADIOLUCENT FOAM MONITORING ELECTRODE -RECTANGULAR SHAPE: Brand: KENDALL

## (undated) DEVICE — SYRINGE MED 20ML STD CLR PLAS LUERLOCK TIP N CTRL DISP

## (undated) DEVICE — SET ADMIN 16ML TBNG L100IN 2 Y INJ SITE IV PIGGY BK DISP

## (undated) DEVICE — CATH IV AUTOGRD BC BLU 22GA 25 -- INSYTE

## (undated) DEVICE — SET EXTN TBNG L BOR 4 W STPCOCK ST 32IN PRIMING VOL 6ML

## (undated) DEVICE — CANN NASAL O2 CAPNOGRAPHY AD -- FILTERLINE

## (undated) DEVICE — ENDO CARRY-ON PROCEDURE KIT INCLUDES ENZYMATIC SPONGE, GAUZE, BIOHAZARD LABEL, TRAY, LUBRICANT, DIRTY SCOPE LABEL, WATER LABEL, TRAY, DRAWSTRING PAD, AND DEFENDO 4-PIECE KIT.: Brand: ENDO CARRY-ON PROCEDURE KIT

## (undated) DEVICE — NEEDLE HYPO 18GA L1.5IN PNK S STL HUB POLYPR SHLD REG BVL

## (undated) DEVICE — SOLIDIFIER FLUID 3000 CC ABSORB

## (undated) DEVICE — KIT IV STRT W CHLORAPREP PD 1ML

## (undated) DEVICE — BAG BELONG PT PERS CLEAR HANDL

## (undated) DEVICE — BW-412T DISP COMBO CLEANING BRUSH: Brand: SINGLE USE COMBINATION CLEANING BRUSH

## (undated) DEVICE — 1200 GUARD II KIT W/5MM TUBE W/O VAC TUBE: Brand: GUARDIAN